# Patient Record
Sex: MALE | Race: ASIAN | Employment: FULL TIME | URBAN - METROPOLITAN AREA
[De-identification: names, ages, dates, MRNs, and addresses within clinical notes are randomized per-mention and may not be internally consistent; named-entity substitution may affect disease eponyms.]

---

## 2017-10-04 ENCOUNTER — HOSPITAL ENCOUNTER (OUTPATIENT)
Dept: PHYSICAL THERAPY | Age: 67
Discharge: HOME | End: 2017-10-04
Payer: MEDICARE

## 2017-10-04 PROCEDURE — 97530 THERAPEUTIC ACTIVITIES: CPT

## 2017-10-04 PROCEDURE — G8985 CARRY GOAL STATUS: HCPCS

## 2017-10-04 PROCEDURE — 97162 PT EVAL MOD COMPLEX 30 MIN: CPT

## 2017-10-04 PROCEDURE — G8984 CARRY CURRENT STATUS: HCPCS

## 2017-10-04 PROCEDURE — 97110 THERAPEUTIC EXERCISES: CPT

## 2017-10-04 NOTE — PROGRESS NOTES
[address] [facility] AT [facility] [address], [address] [facility], [address] - Phone: [phone]  Fax: [phone] / [address] [facility]  Patient Name: Kori Nichole : 1950   Medical   Diagnosis: Low back pain [M54.5]  Cervicalgia [M54.2]  Left cervical radiculopathy [M54.12] Treatment Diagnosis: Low back pain   Cervicalgia   Left cervical radiculopathy   Onset Date:      Referral Source: Caleb Townsend MD Start of Care [facility]): 10/4/2017   Prior Hospitalization: See medical history Provider #: [id number]   Prior Level of Function: Chronic LBP for >10 years   Comorbidities: DM (type 2), Thyroid Problem, HTN, Hx of Cancer (), Prostatectomy (), Acid Reflux    Medications: Verified on Patient Summary List   The Plan of Care and following information is based on the information from the initial evaluation.   ==================================================================================  Assessment / key information: Patient is a 74 y.o. male who presents to [facility] with diagnosis of Low back pain [M54.5]  Cervicalgia [M54.2]  Left cervical radiculopathy [M54.12]. Patient reports chronic hx of LBP that began with service in the [facility]. Neck pain began in  after with insidious onset. Pain is located in lower R back and L neck and described as intermittent dull ache. Pt denies numbness and tingling radiating down the AVI LEs, however, reports constant numbness in the L UE. Notes intermittent L posterior leg pain that radiates down to the calf. Pain level is rated at 3/10 at the best, 6/10 currently, and 8/10 at the worst. LBP increases with prolonged standing and ambulation (10 min), decreases with sitting. C/S pain increases with prolonged computer work.  Upon objective evaluation, patient demonstrates impaired and painful trunk and C/S AROM, postural deviations of FHP and rounded shoulders, impaired strength of cervical flexor muscles,decreased core and multifidus strength, and impaired flexibility of AVI piriformis and hamstring, UT, and levator scap musculature. R SLR and Slump, L Spurling's and Distraction special tests are positive indicating probable neck and lumbar radiculopathy. Patient scored 47 on FOTO indicating decreased functional status and quality of life. Patient can benefit from skilled PT interventions to improve L/S and C/S ROM, flexibility, core strength, decrease pain and TTP and for education on posture, body mechanics and lifting mechanics/transfers to facilitate ADL's & overall functional status/quality of life.    ==================================================================================  Problem List: pain affecting function, decrease ROM, decrease strength, edema affecting function, impaired gait/ balance, decrease ADL/ functional abilities, decrease activity tolerance, decrease flexibility/ joint mobility and decrease transfer abilities   Treatment Plan may include any combination of the following: Therapeutic exercise, Therapeutic activities, Neuromuscular re-education, Physical agent/modality, dry needling, Gait/balance training, Manual therapy and Patient education  Patient / Family readiness to learn indicated by: asking questions, trying to perform skills and interest  Persons(s) to be included in education: patient (P)  Barriers to Learning/Limitations: no  Measures taken:    Patient Goal (s): \"pain to go away and numbness to disappear\"   Patient self reported health status: good  Rehabilitation Potential: good   Short Term Goals: To be accomplished in 2  weeks:  1) Establish HEP. 2) Patient will report decreased c/o pain to < or = 6/10 at the worst to facilitate prolonged computerwork with manageable sx in L/S and C/S.  3) Pt will report a GROC score of +2 (a little bit better) indicating improved symptoms and function.     4) Patient will report 25% improvement in L UE radicular symptoms in order to facilitate ease with prolonged sitting. 5) Improve C/S AROM in L rotation to 50% of normal to facilitate ADL's such as driving.  Long Term Goals: To be accomplished in 6 weeks:  1) Patient independent with HEP and able to demo proper body mechanics for floor to chest lifting. 2) Patient will increase L/S ROM in all directions to pain free and >/= to 85% to increase ability to perform household chores. 3) Increase FOTO to 60 indicating improved function and quality of life. 4) Patient will report centralization of L UE radicular symptoms in order to facilitate ease with prolonged sitting. 5) Improve C/S AROM in all directions to >75% of normal to facilitate ADL's such as driving. 6) Patient to report ability to ambulate for 20 minutes in order to improve ease with grocery shopping. Frequency / Duration:   Patient to be seen  2  times per week for 8  weeks:  Patient / Caregiver education and instruction: self care, activity modification and exercises  G-Codes (GP): Carry [id number] Current  CK= 40-59%    Goal  CK= 40-59%. The severity rating is based on the FOTO Score    Eval Complexity: History: HIGH Complexity :3+ comorbidities / personal factors will impact the outcome/ POC Exam:HIGH Complexity : 4+ Standardized tests and measures addressing body structure, function, activity limitation and / or participation in recreation  Presentation: MEDIUM Complexity : Evolving with changing characteristics  Clinical Decision Making:MEDIUM Complexity : FOTO score of 26-74Overall Complexity:MEDIUM    Therapist Signature: Chucho Cherry Date: 66/3/2844   Certification Period: 10/4/2017 to 1/3/2018 Time: 5:43 PM   ==================================================================================  I certify that the above Physical Therapy Services are being furnished while the patient is under my care.   I agree with the treatment plan and certify that this therapy is necessary. Physician Signature:        Date:       Time:     Please sign and return to [facility] or you may fax the signed copy to [phone]. Thank you.

## 2017-10-11 ENCOUNTER — HOSPITAL ENCOUNTER (OUTPATIENT)
Dept: PHYSICAL THERAPY | Age: 67
Discharge: HOME | End: 2017-10-11
Payer: MEDICARE

## 2017-10-11 PROCEDURE — 97110 THERAPEUTIC EXERCISES: CPT

## 2017-10-11 PROCEDURE — 97140 MANUAL THERAPY 1/> REGIONS: CPT

## 2017-10-16 ENCOUNTER — HOSPITAL ENCOUNTER (OUTPATIENT)
Dept: PHYSICAL THERAPY | Age: 67
Discharge: HOME | End: 2017-10-16
Payer: MEDICARE

## 2017-10-16 PROCEDURE — 97110 THERAPEUTIC EXERCISES: CPT

## 2017-10-16 PROCEDURE — 97140 MANUAL THERAPY 1/> REGIONS: CPT

## 2017-10-16 NOTE — PROGRESS NOTES
PHYSICAL THERAPY - DAILY TREATMENT NOTE    Patient Name: Keith Hernandez        Date: 10/16/2017  : 1950   YES Patient  Verified  Visit #:   3   of   16  Insurance: Payor: [place] MED[place] / Plan: [place] MEDICARE PART A & B / Product Type: Medicare /      In time: 5:00 Out time: 6:10   Total Treatment Time: 70     Medicare Time Tracking (below)   Total Timed Codes (min):  60 1:1 Treatment Time:  60     TREATMENT AREA =  Low back pain [M54.5]  Cervicalgia [M54.2]  Left cervical radiculopathy [M54.12]    SUBJECTIVE  Pain Level (on 0 to 10 scale):  4  / 10 back and RLE, 0/10 neck and arm   Medication Changes/New allergies or changes in medical history, any new surgeries or procedures? NO    If yes, update Summary List   Subjective Functional Status/Changes:  []  No changes reported     Exercise is really helping. Does back bends when back hurts. Has not been having neck or arm pain. OBJECTIVE  Modalities Rationale:     decrease inflammation, decrease pain and increase tissue extensibility to improve patient's ability to perform ADLs   min [] Estim, type/location:                                      []  att     []  unatt     []  w/US     []  w/ice    []  w/heat    min []  Mechanical Traction: type/lbs                   []  pro   []  sup   []  int   []  cont    []  before manual    []  after manual    min []  Ultrasound, settings/location:      min []  Iontophoresis w/ dexamethasone, location:                                               []  take home patch       []  in clinic   10 min [x]  Ice     []  Heat    location/position: Prone/ L/S    min []  Vasopneumatic Device, press/temp:     min []  Other:    [x] Skin assessment post-treatment (if applicable):    [x]  intact    []  redness- no adverse reaction     []redness - adverse reaction:           50 min Therapeutic Exercise:  [x]  See flow sheet   Rationale:      increase ROM and increase strength to improve the patients ability to perform ADLs.    10 min Manual Therapy: Prone sustained pressure R piriformis, SOR, DTM to L UT   Rationale:      decrease pain, increase ROM, increase tissue extensibility and decrease trigger points in L/S to improve patient's ability to tolerate prolonged standing.           min Patient Education:  YES  Reviewed HEP   [x]  Progressed/Changed HEP based on: Add BKFO, engage core, hip add/abd     Other Objective/Functional Measures: Added C/S isometrics 4 way, pec stretch in door, and BKFO     Post Treatment Pain Level (on 0 to 10) scale:   0  / 10     ASSESSMENT  Assessment/Changes in Function:     Back pain decreased with standing trunk extension. RLE pain resolved with ice in  pronelying 10 minutes. Patient needs cueing for proper execution of exercises.      []  See Progress Note/RecertificationPatient will continue to benefit from skilled PT services to modify and progress therapeutic interventions, address functional mobility deficits, address ROM deficits, address strength deficits, analyze and address soft tissue restrictions, analyze and cue movement patterns, analyze and modify body mechanics/ergonomics and assess and modify postural abnormalities to attain remaining goals      Progress toward goals / Updated goals:    Good progress towards STGs     PLAN  [x]  Upgrade activities as tolerated YES Continue plan of care   []  Discharge due to :    []  Other:      Therapist: Amena Stock, PT    Date: 10/16/2017 Time: 5:30 PM     Future Appointments  Date Time Provider Manny Reed   10/18/2017 5:00 PM [address]   10/23/2017 5:30 PM [address]   10/25/2017 5:00 PM [address]   10/30/2017 5:00 PM [address]   11/1/2017 5:00 PM [address]   11/6/2017 5:30 PM [address]   11/8/2017 5:00 PM [facility]

## 2017-10-18 ENCOUNTER — HOSPITAL ENCOUNTER (OUTPATIENT)
Dept: PHYSICAL THERAPY | Age: 67
Discharge: HOME | End: 2017-10-18
Payer: MEDICARE

## 2017-10-18 PROCEDURE — 97140 MANUAL THERAPY 1/> REGIONS: CPT

## 2017-10-18 PROCEDURE — 97110 THERAPEUTIC EXERCISES: CPT

## 2017-10-18 NOTE — PROGRESS NOTES
PHYSICAL THERAPY - DAILY TREATMENT NOTE    Patient Name: Adriane Quiver        Date: 10/18/2017  : 1950   YES Patient  Verified  Visit #:   4   of   16  Insurance: Payor: [place] MEDICARE / Plan: VA MEDICARE PART A & B / Product Type: Medicare /      In time: 5:07pm Out time: 6:17pm   Total Treatment Time: 70     Medicare Time Tracking (below)   Total Timed Codes (min):  60 1:1 Treatment Time:  23     TREATMENT AREA =  Low back pain [M54.5]  Cervicalgia [M54.2]  Left cervical radiculopathy [M54.12]  SUBJECTIVE  Pain Level (on 0 to 10 scale):  0  / 10   Medication Changes/New allergies or changes in medical history, any new surgeries or procedures? NO    If yes, update Summary List   Subjective Functional Status/Changes:  []  No changes reported     Pt states \"no pain today I just have a headache\"         OBJECTIVE  Modalities Rationale:     decrease pain to improve patient's ability to perform functional ADLs   min [] Estim, type/location:                                      []  att     []  unatt     []  w/US     []  w/ice    []  w/heat    min []  Mechanical Traction: type/lbs                   []  pro   []  sup   []  int   []  cont    []  before manual    []  after manual    min []  Ultrasound, settings/location:      min []  Iontophoresis w/ dexamethasone, location:                                               []  take home patch       []  in clinic   10 min [x]  Ice     []  Heat    location/position: Prone L/S      min []  Vasopneumatic Device, press/temp:     min []  Other:    [x] Skin assessment post-treatment (if applicable):    [x]  intact    []  redness- no adverse reaction     []redness - adverse reaction:        50 (bill 13) min Therapeutic Exercise:  [x]  See flow sheet   Rationale:      increase ROM and increase strength to improve the patients ability to perform ADLs.      10 min Manual Therapy: STM to C/S erector spinae, UT, and levator scap, SOR with gentle distractions, Trigger point release to R piriformis, grade 2/3 CPA mobes to C3-6   Rationale:      decrease pain, increase ROM, increase tissue extensibility and decrease trigger points in L/S to improve patient's ability to tolerate prolonged standing. min Patient Education:  YES  Reviewed HEP   []  Progressed/Changed HEP based on: Other Objective/Functional Measures: Added lat pulls and tband rows and UBE warm up with good pt tolerance and no complaints of sx. Post Treatment Pain Level (on 0 to 10) scale:   0  / 10     ASSESSMENT  Assessment/Changes in Function:     Pt presented with increased TTP and mm tone in sub occipitals Presented with left finger numbness which was abolished with REIL. Continued to educate patient in importance of C/S REIL/URIEL in and posture/sitting ergonomincs while on the computer at work. Decreased headache s/p MT with SOR and pressure to suboccipitals. []  See Progress Note/Recertification   Patient will continue to benefit from skilled PT services to modify and progress therapeutic interventions, address functional mobility deficits, address ROM deficits, address strength deficits, analyze and address soft tissue restrictions, analyze and cue movement patterns, analyze and modify body mechanics/ergonomics and assess and modify postural abnormalities to attain remaining goals. Progress toward goals / Updated goals:    Progressing towards STG 1 and 5.   1) Establish HEP. Goal in progress -Intermittent peformance of HEP, no C/S ret today secondary to headache  2) Patient will report decreased c/o pain to < or = 6/10 at the worst to facilitate prolonged computerwork with manageable sx in L/S and C/S. Goal Met - 5/10 @ worst   3) Pt will report a GROC score of +2 (a little bit better) indicating improved symptoms and function. Goal Met (+5) A Good Deal Better  4) Patient will report 25% improvement in L UE radicular symptoms in order to facilitate ease with prolonged sitting.  Goal Met 100% improvement in L UE sx   5) Improve C/S AROM in L rotation to 50% of normal to facilitate ADL's such as driving.   Not assessed     PLAN  [x]  Upgrade activities as tolerated YES Continue plan of care   []  Discharge due to :    []  Other:      Therapist: Wilda Gore DPT     Date: 10/18/2017 Time: 12:31 PM     Future Appointments  Date Time Provider Manny Reed   10/18/2017 5:00 PM [address]   10/23/2017 5:30 PM [address]   10/25/2017 5:00 PM [address]   10/30/2017 5:00 PM [address]   11/1/2017 5:00 PM [address]   11/6/2017 5:30 PM [address]   11/8/2017 5:00 PM Chucho Cherry [facility]

## 2017-10-23 ENCOUNTER — HOSPITAL ENCOUNTER (OUTPATIENT)
Dept: PHYSICAL THERAPY | Age: 67
Discharge: HOME | End: 2017-10-23
Payer: MEDICARE

## 2017-10-23 PROCEDURE — 97110 THERAPEUTIC EXERCISES: CPT

## 2017-10-23 PROCEDURE — 97140 MANUAL THERAPY 1/> REGIONS: CPT

## 2017-10-23 NOTE — PROGRESS NOTES
PHYSICAL THERAPY - DAILY TREATMENT NOTE    Patient Name: Aisha Sandhu        Date: 10/23/2017  : 1950   YES Patient  Verified  Visit #:   5   of   16  Insurance: Payor: Reginaldo Ice / Plan: [place] MEDICARE PART A & B / Product Type: Medicare /      In time: 5:35pm  Out time: 6:38pm   Total Treatment Time: 63     Medicare Time Tracking (below)   Total Timed Codes (min):  53 1:1 Treatment Time:  38     TREATMENT AREA =  Low back pain [M54.5]  Cervicalgia [M54.2]  Left cervical radiculopathy [M54.12]  SUBJECTIVE  Pain Level (on 0 to 10 scale):  3-4  / 10   Medication Changes/New allergies or changes in medical history, any new surgeries or procedures? NO    If yes, update Summary List   Subjective Functional Status/Changes:  []  No changes reported     Pt states \"i was taking the bus to work and I was in the middle of the seat and I was looking down because it was crowded\"         OBJECTIVE  Modalities Rationale:     decrease pain to improve patient's ability to perform functional ADLs   min [] Estim, type/location:                                      []  att     []  unatt     []  w/US     []  w/ice    []  w/heat    min []  Mechanical Traction: type/lbs                   []  pro   []  sup   []  int   []  cont    []  before manual    []  after manual    min []  Ultrasound, settings/location:      min []  Iontophoresis w/ dexamethasone, location:                                               []  take home patch       []  in clinic   10 min [x]  Ice     []  Heat    location/position: L/S in prone lying    min []  Vasopneumatic Device, press/temp:     min []  Other:    [x] Skin assessment post-treatment (if applicable):    [x]  intact    []  redness- no adverse reaction     []redness - adverse reaction:        43 (bill 28) min Therapeutic Exercise:  [x]  See flow sheet   Rationale:      increase ROM and increase strength to improve the patients ability to perform ADLs.      10 min Manual Therapy: STM to C/S erector spinae, UT, and levator scap, SOR with gentle distractions, Trigger point release to R piriformis, grade 2/3 CPA mobes to C3-6   Rationale:      decrease pain, increase ROM, increase tissue extensibility and decrease trigger points in L/S to improve patient's ability to tolerate prolonged standing. min Patient Education:  YES  Reviewed HEP   []  Progressed/Changed HEP based on: Other Objective/Functional Measures: Added EBONI and PPU with good pt tolerance and no complaints of sx. Progressed ham stretch to stairs  C/S L Rot = 72 deg     Post Treatment Pain Level (on 0 to 10) scale:   0  / 10     ASSESSMENT  Assessment/Changes in Function:     Pt presented with increased TTP and mm tone in AVI UT and levator scap. Cont to demonstrate nod with C/S retraction thus required VCs in order to ensure proper form for max benefit. Good tolerance for progression of Mariel extension. Demonstrates significant increases in C/S rotation L without sx.      []  See Progress Note/Recertification   Patient will continue to benefit from skilled PT services to modify and progress therapeutic interventions, address functional mobility deficits, address ROM deficits, address strength deficits, analyze and address soft tissue restrictions, analyze and cue movement patterns, analyze and modify body mechanics/ergonomics and assess and modify postural abnormalities to attain remaining goals. Progress toward goals / Updated goals:    Progressing towards STG 1 and 5.   1) Establish HEP.  Goal in progress - Performance of HEP at work  2) Patient will report decreased c/o pain to < or = 6/10 at the worst to facilitate prolonged computerwork with manageable sx in L/S and C/S.  Goal Met - 5/10 @ worst (10/18)  3) Pt will report a GROC score of +2 (a little bit better) indicating improved symptoms and function.  Goal Met (+5) A Good Deal Better (10/18)  4) Patient will report 25% improvement in L UE radicular symptoms in order to facilitate ease with prolonged sitting.  Goal Met 100% improvement in L UE sx (10/18)  5) Improve C/S AROM in L rotation to 50% of normal to facilitate ADL's such as driving.  C/S L Rot = 72 deg (10/23)     PLAN  [x]  Upgrade activities as tolerated YES Continue plan of care   []  Discharge due to :    []  Other:      Therapist: Chloé Apodaca DPT     Date: 10/23/2017 Time: 2:04 PM     Future Appointments  Date Time Provider Manny Reed   10/23/2017 5:30 PM [address]   10/25/2017 5:00 PM [address]   10/30/2017 5:00 PM [address]   11/1/2017 5:00 PM [address]   11/6/2017 5:30 PM [address]   11/8/2017 5:00 PM [address]

## 2017-10-25 ENCOUNTER — HOSPITAL ENCOUNTER (OUTPATIENT)
Dept: PHYSICAL THERAPY | Age: 67
Discharge: HOME | End: 2017-10-25
Payer: MEDICARE

## 2017-10-25 PROCEDURE — 97110 THERAPEUTIC EXERCISES: CPT

## 2017-10-25 PROCEDURE — 97140 MANUAL THERAPY 1/> REGIONS: CPT

## 2017-10-25 NOTE — PROGRESS NOTES
PHYSICAL THERAPY - DAILY TREATMENT NOTE    Patient Name: Kaylah Saleh        Date: 10/25/2017  : 1950   YES Patient  Verified  Visit #:   6      16  Insurance: Payor: Juan Pablo Collins / Plan: [place] MEDICARE PART A & B / Product Type: Medicare /      In time: 5:03pm Out time: 6:15pm   Total Treatment Time: 72     Medicare Time Tracking (below)   Total Timed Codes (min):  62 1:1 Treatment Time:  38     TREATMENT AREA =  Low back pain [M54.5]  Cervicalgia [M54.2]  Left cervical radiculopathy [M54.12]  SUBJECTIVE  Pain Level (on 0 to 10 scale):  2  / 10   Medication Changes/New allergies or changes in medical history, any new surgeries or procedures? NO    If yes, update Summary List   Subjective Functional Status/Changes:  []  No changes reported     Pt states \"the pain is on the right side of the lower back\"         OBJECTIVE  Modalities Rationale:     decrease pain to improve patient's ability to perform functional ADLs   min [] Estim, type/location:                                      []  att     []  unatt     []  w/US     []  w/ice    []  w/heat    min []  Mechanical Traction: type/lbs                   []  pro   []  sup   []  int   []  cont    []  before manual    []  after manual    min []  Ultrasound, settings/location:      min []  Iontophoresis w/ dexamethasone, location:                                               []  take home patch       []  in clinic   10 min [x]  Ice     []  Heat    location/position: L/S in prone lying    min []  Vasopneumatic Device, press/temp:     min []  Other:    [x] Skin assessment post-treatment (if applicable):    [x]  intact    []  redness- no adverse reaction     []redness - adverse reaction:        52(bill 28) min Therapeutic Exercise:  [x]  See flow sheet   Rationale:      increase ROM and increase strength to improve the patients ability to perform ADLs.      10 min Manual Therapy: STM to C/S erector spinae, UT, and levator scap, SOR with gentle distractions, MET to correct R ant grade 2/3 CPA mobes to C3-6   Rationale:      decrease pain, increase ROM, increase tissue extensibility and decrease trigger points in L/S to improve patient's ability to tolerate prolonged standing. min Patient Education:  YES  Reviewed HEP   []  Progressed/Changed HEP based on: Other Objective/Functional Measures: Added UT stretch and march with good pt tolerance and no complaints of sx. FF and [place] (+) R      Post Treatment Pain Level (on 0 to 10) scale:   2  / 10     ASSESSMENT  Assessment/Changes in Function:     Pt presented with increased TTP and mm tone in AVI UT. Tenderness to C7 with CPA mobes. Demonstrated R ant rotated innominate and positive SI testing. Patient denies L UE sx. Continues to present with decr L/S prone press up extension of about ~50%. []  See Progress Note/Recertification   Patient will continue to benefit from skilled PT services to modify and progress therapeutic interventions, address functional mobility deficits, address ROM deficits, address strength deficits, analyze and address soft tissue restrictions, analyze and cue movement patterns, analyze and modify body mechanics/ergonomics and assess and modify postural abnormalities to attain remaining goals. Progress toward goals / Updated goals:    Progressing towards STG 1 and 5.   1) Establish HEP.  Goal in progress - Performance of HEP at work  2) Patient will report decreased c/o pain to < or = 6/10 at the worst to facilitate prolonged computerwork with manageable sx in L/S and C/S.  Goal Met - 5/10 @ worst (10/18)  3) Pt will report a GROC score of +2 (a little bit better) indicating improved symptoms and function.  Goal Met (+5) A Good Deal Better (10/18)  4) Patient will report 25% improvement in L UE radicular symptoms in order to facilitate ease with prolonged sitting. Goal Met 100% improvement in L UE sx (10/18)  5) Improve C/S AROM in L rotation to 50% of normal to facilitate ADL's such as driving.  C/S L Rot = 72 deg (10/23)     PLAN  [x]  Upgrade activities as tolerated YES Continue plan of care   []  Discharge due to :    []  Other:      Therapist: Wilda Gore DPT     Date: 10/25/2017 Time: 12:38 PM     Future Appointments  Date Time Provider Manny Reed   10/25/2017 5:00 PM [address]   10/30/2017 5:00 PM [address]   11/1/2017 5:00 PM [address]   11/6/2017 5:30 PM [address]   11/8/2017 5:00 PM Chucho Cherry [facility]

## 2017-10-27 NOTE — PROGRESS NOTES
PHYSICAL THERAPY - DAILY TREATMENT NOTE    Patient Name: Aimee Silva        Date: 10/30/2017  : 1950   YES Patient  Verified  Visit #:     Insurance: Payor: [place] MED[place] / Plan: [place] MEDICARE PART A & B / Product Type: Medicare /      In time: 5:25pm Out time: 6:32pm   Total Treatment Time: 67     Medicare Time Tracking (below)   Total Timed Codes (min):  67 1:1 Treatment Time:  26     TREATMENT AREA =  Low back pain [M54.5]  Cervicalgia [M54.2]  Left cervical radiculopathy [M54.12]  SUBJECTIVE  Pain Level (on 0 to 10 scale):  0  / 10   Medication Changes/New allergies or changes in medical history, any new surgeries or procedures? NO    If yes, update Summary List   Subjective Functional Status/Changes:  []  No changes reported     Pt states \"I didn't work this weekend so no pain. I haven't had any arm or leg sx\"       OBJECTIVE  Modalities Rationale:     PD    58 (bill 17) min Therapeutic Exercise:  [x]  See flow sheet   Rationale:      increase ROM and increase strength to improve the patients ability to perform ADLs. 9 min Manual Therapy: STM to C/S erector spinae, UT, and levator scap, SOR with gentle distractions, MET to correct R ant grade 2/3 CPA mobes to C3-6   Rationale:      decrease pain, increase ROM, increase tissue extensibility and decrease trigger points in L/S to improve patient's ability to tolerate prolonged standing. min Patient Education:  YES  Reviewed HEP   []  Progressed/Changed HEP based on: Other Objective/Functional Measures: Added Tband IR/ER with good pt tolerance and no complaints of sx. Post Treatment Pain Level (on 0 to 10) scale:   0  / 10     ASSESSMENT  Assessment/Changes in Function:     Pt presented with improved TTP and mm tone in R piriformis and R UT. Demonstrates good and pain free bridge. Progressed L RTC strengthening as appropriate.        []  See Progress Note/Recertification   Patient will continue to benefit from skilled PT services to modify and progress therapeutic interventions, address functional mobility deficits, address ROM deficits, address strength deficits, analyze and address soft tissue restrictions, analyze and cue movement patterns, analyze and modify body mechanics/ergonomics and assess and modify postural abnormalities to attain remaining goals. Progress toward goals / Updated goals:    Progressing towards all STGs.  Progress note due NV     PLAN  [x]  Upgrade activities as tolerated YES Continue plan of care   []  Discharge due to :    []  Other:      Therapist: Huey Mera DPT     Date: 10/30/2017 Time: 1:26 PM     Future Appointments  Date Time Provider Manny Reed   10/30/2017 5:00 PM [address]   11/1/2017 5:00 PM [address]   11/6/2017 5:30 PM [address]   11/8/2017 5:00 PM [address]

## 2017-10-30 ENCOUNTER — HOSPITAL ENCOUNTER (OUTPATIENT)
Dept: PHYSICAL THERAPY | Age: 67
Discharge: HOME | End: 2017-10-30
Payer: MEDICARE

## 2017-10-30 PROCEDURE — 97140 MANUAL THERAPY 1/> REGIONS: CPT

## 2017-10-30 PROCEDURE — 97110 THERAPEUTIC EXERCISES: CPT

## 2017-11-01 ENCOUNTER — HOSPITAL ENCOUNTER (OUTPATIENT)
Dept: PHYSICAL THERAPY | Age: 67
Discharge: HOME | End: 2017-11-01
Payer: MEDICARE

## 2017-11-01 PROCEDURE — G8979 MOBILITY GOAL STATUS: HCPCS

## 2017-11-01 PROCEDURE — 97110 THERAPEUTIC EXERCISES: CPT

## 2017-11-01 PROCEDURE — G8980 MOBILITY D/C STATUS: HCPCS

## 2017-11-01 PROCEDURE — 97140 MANUAL THERAPY 1/> REGIONS: CPT

## 2017-11-01 NOTE — PROGRESS NOTES
[facility] THERAPY  [address]ey [address] -   Phone: [phone]  Fax: [phone]  [x]  PROGRESS NOTE  []  DISCHARGE SUMMARY  Patient Name: Cm No : 1950   Treatment Diagnosis: Low back pain [M54.5]  Cervicalgia [M54.2]  Left cervical radiculopathy [M54.12]     Referral Source: Ethan Chopra MD     Date of Initial Visit: 10/04/2017 Attended Visits: 8 Missed Visits: 0     SUMMARY OF TREATMENT  Therapeutic exercises including ROM, strengthening, stretching, manual therapy including joint and soft tissue manipulation, balance training, postural re-education, modalities: ice and moist hot pack, HEP instruction. CURRENT STATUS  The pt has progressed well with therapy, consistently reporting decreased pain and increased functional ability. Currently, the patient's main complaint is R buttocks pain with prolonged working. Currently, pt reports 50% improvement in back sx since IE. Average lumbar pain is rated at  2-3/10 and  5/10 at the worst.Trunk ROM is as follows: flexion 90%, extension 75%, R/L Side bending 100/100%, and R/L Rotation 100%/100%. Pt would benefit from continued PT services in order to improve L/S AROM, core strength, LE strength, flexibility, functional mobility and address remaining impairments. Goal/Measure of Progress Goal Met? 1.  Establish HEP to prevent further disability. Status at last Eval: Established Current Status: I with HEP yes   2. Patient will report decreased c/o pain to < or = 6/10 at the worst to facilitate prolonged computerwork with manageable sx in L/S and C/S. Status at last Eval: 8/10 @ worst  Current Status: 5/10 worst yes   3. Patient will report centralization of L UE radicular symptoms in order to facilitate ease with prolonged sitting. Status at last Eval: L UE numbness and tingling Current Status: >50% improvement in L UE sx Progressing   4.   Pt will report a GROC score of +2 (a little bit better) indicating improved symptoms and function. Status at last Eval: Goal Established Current Status: (+5) A good deal better yes   5. Increase FOTO to 60 indicating improved function and quality of life. 54 Current Status: 64 yes     New Goals to be achieved in __3-4__  Weeks:  1. Patient to be independent with HEP and able to demo proper body mechanics for floor to chest lifting. 2.  Patient will increase L/S ROM in all directions to pain free and >/= to 85% to increase ability to perform household chores. 4.  Improve C/S AROM in all directions to >75% of normal to facilitate ADL's such as driving. 5.  Patient to report ability to ambulate for 20 minutes in order to improve ease with grocery shopping. G-Codes (GP): Carry  [id number] Goal  CK= 40-59%  [id number] D/C  CJ= 20-39%. The severity rating is based on the FOTO Score    RECOMMENDATIONS  Continue therapy with the following recommendations: 1-2x per week for 3-4 weeks    If you have any questions/comments please contact us directly at [phone]   Thank you for allowing us to assist in the care of your patient. Therapist Signature: Angie Sweeney DPT Date: 11/1/2017     Time: 4:37 PM   NOTE TO PHYSICIAN:  PLEASE COMPLETE THE ORDERS BELOW AND FAX TO   [facility] Physical Therapy: [phone]  If you are unable to process this request in 24 hours please contact our office: [phone]    ___ I have read the above report and request that my patient continue as recommended.   ___ I have read the above report and request that my patient continue therapy with the following changes/special instructions:_________________________________________________________   ___ I have read the above report and request that my patient be discharged from therapy.      Physician Signature:        Date:       Time:

## 2017-11-01 NOTE — PROGRESS NOTES
PHYSICAL THERAPY - DAILY TREATMENT NOTE    Patient Name: Michele Gallegos        Date: 2017  : 1950   YES Patient  Verified  Visit #:   8      16  Insurance: Payor: Apollo Jaeger / Plan: [place] MEDICARE PART A & B / Product Type: Medicare /      In time: 4:54 pm Out time: 6:14pm   Total Treatment Time: 80     Medicare Time Tracking (below)   Total Timed Codes (min):  80 1:1 Treatment Time:  40     TREATMENT AREA =  Low back pain [M54.5]  Cervicalgia [M54.2]  Left cervical radiculopathy [M54.12]  SUBJECTIVE  Pain Level (on 0 to 10 scale):  0  / 10   Medication Changes/New allergies or changes in medical history, any new surgeries or procedures? NO    If yes, update Summary List   Subjective Functional Status/Changes:  []  No changes reported     Pt states \"the muscle in the bottom still bothers me\"         OBJECTIVE  Modalities Rationale:   PD    71 (bill 31) min Therapeutic Exercise:  [x]  See flow sheet   Rationale:      increase ROM and increase strength to improve the patients ability to perform ADLs. 9 min Manual Therapy: Sustained pressure to R piriformis, Supine AVI hamstring stretch, MET to correct R ant   Rationale:      decrease pain, increase ROM, increase tissue extensibility and decrease trigger points in L/S to improve patient's ability to tolerate prolonged standing. min Patient Education:  YES  Reviewed HEP   []  Progressed/Changed HEP based on:         Other Objective/Functional Measures:    PN     Post Treatment Pain Level (on 0 to 10) scale:   0  / 10     ASSESSMENT  Assessment/Changes in Function:     PN     []  See Progress Note/Recertification   Patient will continue to benefit from skilled PT services to modify and progress therapeutic interventions, address functional mobility deficits, address ROM deficits, address strength deficits, analyze and address soft tissue restrictions, analyze and cue movement patterns, analyze and modify body mechanics/ergonomics and assess and modify postural abnormalities to attain remaining goals.    Progress toward goals / Updated goals:    PN     PLAN  [x]  Upgrade activities as tolerated YES Continue plan of care   []  Discharge due to :    []  Other:      Therapist: Guido Becerra DPT     Date: 11/1/2017 Time: 4:36 PM     Future Appointments  Date Time Provider Manny Reed   11/1/2017 5:00 PM [address]   11/6/2017 5:30 PM [address]   11/8/2017 5:00 PM [facility]

## 2017-11-06 ENCOUNTER — HOSPITAL ENCOUNTER (OUTPATIENT)
Dept: PHYSICAL THERAPY | Age: 67
Discharge: HOME | End: 2017-11-06
Payer: MEDICARE

## 2017-11-06 PROCEDURE — 97140 MANUAL THERAPY 1/> REGIONS: CPT

## 2017-11-06 PROCEDURE — G8979 MOBILITY GOAL STATUS: HCPCS

## 2017-11-06 PROCEDURE — G8978 MOBILITY CURRENT STATUS: HCPCS

## 2017-11-06 PROCEDURE — 97110 THERAPEUTIC EXERCISES: CPT

## 2017-11-06 NOTE — PROGRESS NOTES
PHYSICAL THERAPY - DAILY TREATMENT NOTE    Patient Name: Prerna Prescott        Date: 2017  : 1950   YES Patient  Verified  Visit #:   (9) 1   of   8  Insurance: Payor: Janay Borne / Plan: [place] MEDICARE PART A & B / Product Type: Medicare /      In time: 5:04 pm Out time: 6:20 pm   Total Treatment Time: 76     Medicare Time Tracking (below)   Total Timed Codes (min):  66 1:1 Treatment Time:  38     TREATMENT AREA =  Low back pain [M54.5]  Cervicalgia [M54.2]  Left cervical radiculopathy [M54.12]  SUBJECTIVE  Pain Level (on 0 to 10 scale):  0  / 10   Medication Changes/New allergies or changes in medical history, any new surgeries or procedures? NO    If yes, update Summary List   Subjective Functional Status/Changes:  []  No changes reported     Pt states \"olga been using the tennis ball and doing my stretch\"         OBJECTIVE  Modalities Rationale:     decrease pain to improve patient's ability to perform functional ADLs   min [] Estim, type/location:                                      []  att     []  unatt     []  w/US     []  w/ice    []  w/heat    min []  Mechanical Traction: type/lbs                   []  pro   []  sup   []  int   []  cont    []  before manual    []  after manual    min []  Ultrasound, settings/location:      min []  Iontophoresis w/ dexamethasone, location:                                               []  take home patch       []  in clinic   10 min [x]  Ice     []  Heat    location/position: C/S in supine lying    min []  Vasopneumatic Device, press/temp:     min []  Other:    [x] Skin assessment post-treatment (if applicable):    [x]  intact    []  redness- no adverse reaction     []redness - adverse reaction:        58 (bill 30) min Therapeutic Exercise:  [x]  See flow sheet   Rationale:      increase ROM and increase strength to improve the patients ability to perform ADLs.      8 min Manual Therapy: Sustained pressure to R piriformis, Supine STM to AVI UT   Rationale: decrease pain, increase ROM, increase tissue extensibility and decrease trigger points in L/S to improve patient's ability to tolerate prolonged standing. min Patient Education:  YES  Reviewed HEP   []  Progressed/Changed HEP based on: Other Objective/Functional Measures: Added prone hip extension, SLR with core, and pigeon stretch with good pt tolerance and no complaints of sx. Post Treatment Pain Level (on 0 to 10) scale:   0  / 10     ASSESSMENT  Assessment/Changes in Function:     Pt presented with improved TTP and mm tone in R piriformis. Educated patient in progression for UT and levator scap stretch, as well as proper form in order to ensure max benefit. Progressed L/S strengthening as appropriate. []  See Progress Note/Recertification   Patient will continue to benefit from skilled PT services to modify and progress therapeutic interventions, address functional mobility deficits, address ROM deficits, address strength deficits, analyze and address soft tissue restrictions, analyze and cue movement patterns, analyze and modify body mechanics/ergonomics and assess and modify postural abnormalities to attain remaining goals. Progress toward goals / Updated goals:    Progressing towards newly est LTGs.       PLAN  [x]  Upgrade activities as tolerated YES Continue plan of care   []  Discharge due to :    []  Other:      Therapist: Jameel Disla DPT     Date: 11/6/2017 Time: 12:48 PM     Future Appointments  Date Time Provider Manny Reed   11/6/2017 5:30 PM [address]   11/8/2017 5:00 PM [address]

## 2017-11-08 ENCOUNTER — HOSPITAL ENCOUNTER (OUTPATIENT)
Dept: PHYSICAL THERAPY | Age: 67
Discharge: HOME | End: 2017-11-08
Payer: MEDICARE

## 2017-11-08 PROCEDURE — G8978 MOBILITY CURRENT STATUS: HCPCS

## 2017-11-08 PROCEDURE — G8979 MOBILITY GOAL STATUS: HCPCS

## 2017-11-08 PROCEDURE — 97110 THERAPEUTIC EXERCISES: CPT

## 2017-11-08 NOTE — PROGRESS NOTES
[facility] PHYSICAL THERAPY  [facility] [address] - Phone: [phone]  Fax: [phone] SUMMARY FOR PHYSICAL THERAPY          Patient Name: Jasmine Ayers : 1950   Treatment/Medical Diagnosis: Low back pain [M54.5]  Cervicalgia [M54.2]  Left cervical radiculopathy [M54.12]   Onset Date:     Referral Source: Nikki Velazquez MD Start of Care [facility] [place]): 10/4/2017   Prior Hospitalization: See Medical History Provider #: [id number]   Prior Level of Function: Independent and painfree/painfull ADLs, IADLs, and recreational activity   Comorbidities: DM (type 2), Thyroid Problem, HTN, Hx of Cancer (), Prostatectomy (), Acid Reflux   Medications: Verified on Patient Summary List   Visits from [facility]: 10 Missed Visits: 0     Goal/Measure of Progress Goal Met? 1. Patient will report centralization of L UE radicular symptoms in order to facilitate ease with prolonged sitting. Status at last Eval: >50% improvement in L UE sx Current Status: Able to centralize C/S sx with C/S retraction yes   2. Patient to report ability to ambulate for 20 minutes in order to improve ease with grocery shopping. Status at last Eval: 10 min ambulation Current Status: 20 min ambulation yes   3. Improve C/S AROM in all directions to >75% of normal to facilitate ADL's such as driving. Status at last Eval: Flexion = 64%  Extension = 67%  R/L Sidebending = 42/32%  R/L Rot = 65/42% Current Status: Flexion = 100%  Extension = 100%  R/L Sidebending = 100%  R/L Rot = 100% yes   4. Patient will increase L/S ROM in all directions to pain free and >/= to 85% to increase ability to perform household chores.     Status at last Eval: Flexion = 8%  Extension = 75%  R/L Sidebending = 75/50%  R/L Rot = 100% Current Status: Flexion = 85%  Extension = 85%  R/L Sidebending = 100%  R/L Rot = 100% yes     Key Functional Changes/Progress:  The pt has progressed well with therapy, consistently reporting decreased pain and increased functional ability. Pt reports 85% improvement in back and C/S sx since initiating PT services. Based on progress from PT services and pt reported improvement, patient is appropriate for DC to home mgt of sx at this time    G-Codes (GP): Mobility   Goal  CK= 40-59%  D/C  CI= 1-19%. The severity rating is based on the FOTO Score    Assessments/Recommendations: Discontinue therapy. Progressing towards or have reached established goals. If you have any questions/comments please contact us directly at  [phone]. Thank you for allowing us to assist in the care of your patient.     Therapist Signature: Ashvin Daigle Date: 11/8/2017   Reporting Period: 10/4/2017 to 11/8/2017 Time: 5:03 PM

## 2017-11-08 NOTE — PROGRESS NOTES
PHYSICAL THERAPY - DAILY TREATMENT NOTE    Patient Name: Janis Lantigua        Date: 2017  : 1950   YES Patient  Verified  Visit #:   10 2    of   8  Insurance: Payor: Carlie Ordaz / Plan: [place] MEDICARE PART A & B / Product Type: Medicare /      In time: 4:58 pm Out time: 5:59pm   Total Treatment Time: 61     Medicare Time Tracking (below)   Total Timed Codes (min):  61 1:1 Treatment Time:  23     TREATMENT AREA =  Low back pain [M54.5]  Cervicalgia [M54.2]  Left cervical radiculopathy [M54.12]  SUBJECTIVE  Pain Level (on 0 to 10 scale):  0  / 10   Medication Changes/New allergies or changes in medical history, any new surgeries or procedures? NO    If yes, update Summary List   Subjective Functional Status/Changes:  []  No changes reported     Pt states \"i get up from the chair every 30 minutes\"         OBJECTIVE  Modalities Rationale:     decrease pain to improve patient's ability to perform functional ADLs   min [] Estim, type/location:                                      []  att     []  unatt     []  w/US     []  w/ice    []  w/heat    min []  Mechanical Traction: type/lbs                   []  pro   []  sup   []  int   []  cont    []  before manual    []  after manual    min []  Ultrasound, settings/location:      min []  Iontophoresis w/ dexamethasone, location:                                               []  take home patch       []  in clinic   10 min []  Ice     []  Heat    location/position: C/S in supine lying    min []  Vasopneumatic Device, press/temp:     min []  Other:    [x] Skin assessment post-treatment (if applicable):    [x]  intact    []  redness- no adverse reaction     []redness - adverse reaction:        61 (bill 23) min Therapeutic Exercise:  [x]  See flow sheet   Rationale:      increase ROM and increase strength to improve the patients ability to perform ADLs. min Patient Education:  YES  Reviewed HEP   []  Progressed/Changed HEP based on:         Other Objective/Functional Measures:    FOTO = 57   Post Treatment Pain Level (on 0 to 10) scale:   0  / 10     ASSESSMENT  Assessment/Changes in Function:     See DC     []  See Progress Note/Recertification   Patient will continue to benefit from skilled PT services to modify and progress therapeutic interventions, address functional mobility deficits, address ROM deficits, address strength deficits, analyze and address soft tissue restrictions, analyze and cue movement patterns, analyze and modify body mechanics/ergonomics and assess and modify postural abnormalities to attain remaining goals.    Progress toward goals / Updated goals:    See DC      PLAN  [x]  Upgrade activities as tolerated YES Continue plan of care   []  Discharge due to :    []  Other:      Therapist: Nova Isaacs DPT     Date: 11/8/2017 Time: 12:46 PM     Future Appointments  Date Time Provider Manny Reed   11/8/2017 5:00 PM [address]

## 2024-10-04 NOTE — PROGRESS NOTES
PHYSICAL THERAPY - DAILY TREATMENT NOTE    Patient Name: Lani Hopkins        Date: 10/11/2017  : 1950   YES Patient  Verified  Visit #:     Insurance: Payor: [place] MEDICARE / Plan: [place] MEDICARE PART A & B / Product Type: Medicare /      In time: 5:33 pm Out time: 6;16pm   Total Treatment Time: 43     Medicare Time Tracking (below)   Total Timed Codes (min):  43 1:1 Treatment Time:  43     TREATMENT AREA =  Low back pain [M54.5]  Cervicalgia [M54.2]  Left cervical radiculopathy [M54.12]  SUBJECTIVE  Pain Level (on 0 to 10 scale):  2  / 10   Medication Changes/New allergies or changes in medical history, any new surgeries or procedures? NO    If yes, update Summary List   Subjective Functional Status/Changes:  []  No changes reported     Pt states \"i feel sore and the numbness in the arm has gone down and almost gone, still numbness in the hand, standing for a long time really bothers me and going up the stairs hurts the back too\"         OBJECTIVE  Modalities Rationale:   PD    29 min Therapeutic Exercise:  [x]  See flow sheet   Rationale:      increase ROM and increase strength to improve the patients ability to perform ADLs. 14 min Manual Therapy: Prone sustained pressure R piriformis, manual traction for 5 min, SOR, STM to L UT   Rationale:      decrease pain, increase ROM, increase tissue extensibility and decrease trigger points in L/S to improve patient's ability to tolerate prolonged standing. min Patient Education:  YES  Reviewed HEP   []  Progressed/Changed HEP based on: Other Objective/Functional Measures: Added abdominal draw, piriformis stretch, ham stretch with good pt tolerance and no complaints of sx. Post Treatment Pain Level (on 0 to 10) scale:   0  / 10     ASSESSMENT  Assessment/Changes in Function:     Pt presented with increased TTP and mm tone in L UT and R piriformis. Manual traction decreased L UE sx, consider traction unit next visit.  Updated Patient's mom is inquiring about patient's Adderall prescription.   and issued HEP with good patient under standing. []  See Progress Note/Recertification   Patient will continue to benefit from skilled PT services to modify and progress therapeutic interventions, address functional mobility deficits, address ROM deficits, address strength deficits, analyze and address soft tissue restrictions, analyze and cue movement patterns, analyze and modify body mechanics/ergonomics and assess and modify postural abnormalities to attain remaining goals. Progress toward goals / Updated goals:    First visit after initial evaluation. Progress tx per POC.         PLAN  [x]  Upgrade activities as tolerated YES Continue plan of care   []  Discharge due to :    []  Other:      Therapist: Eran Almeida DPT     Date: 10/11/2017 Time: 12:04 PM     Future Appointments  Date Time Provider Manny Reed   10/11/2017 5:30 PM [address]   10/16/2017 5:00 PM [address]   10/18/2017 5:00 PM [address]   10/23/2017 5:30 PM [address]   10/25/2017 5:00 PM [address]   10/30/2017 5:00 PM [address]   11/1/2017 5:00 PM [address]   11/6/2017 5:30 PM [address]   11/8/2017 5:00 PM Dorlene Day [facility]